# Patient Record
Sex: FEMALE
[De-identification: names, ages, dates, MRNs, and addresses within clinical notes are randomized per-mention and may not be internally consistent; named-entity substitution may affect disease eponyms.]

---

## 2019-09-10 ENCOUNTER — APPOINTMENT (OUTPATIENT)
Dept: OTOLARYNGOLOGY | Facility: CLINIC | Age: 19
End: 2019-09-10
Payer: COMMERCIAL

## 2019-09-10 VITALS
WEIGHT: 125 LBS | BODY MASS INDEX: 20.83 KG/M2 | DIASTOLIC BLOOD PRESSURE: 55 MMHG | HEART RATE: 61 BPM | HEIGHT: 65 IN | SYSTOLIC BLOOD PRESSURE: 101 MMHG

## 2019-09-10 DIAGNOSIS — Z78.9 OTHER SPECIFIED HEALTH STATUS: ICD-10-CM

## 2019-09-10 DIAGNOSIS — Q44.7 OTHER CONGENITAL MALFORMATIONS OF LIVER: ICD-10-CM

## 2019-09-10 DIAGNOSIS — H90.0 CONDUCTIVE HEARING LOSS, BILATERAL: ICD-10-CM

## 2019-09-10 DIAGNOSIS — H69.83 OTHER SPECIFIED DISORDERS OF EUSTACHIAN TUBE, BILATERAL: ICD-10-CM

## 2019-09-10 DIAGNOSIS — Z86.79 PERSONAL HISTORY OF OTHER DISEASES OF THE CIRCULATORY SYSTEM: ICD-10-CM

## 2019-09-10 PROBLEM — Z00.00 ENCOUNTER FOR PREVENTIVE HEALTH EXAMINATION: Status: ACTIVE | Noted: 2019-09-10

## 2019-09-10 PROCEDURE — 99203 OFFICE O/P NEW LOW 30 MIN: CPT

## 2019-09-10 PROCEDURE — 92567 TYMPANOMETRY: CPT

## 2019-09-10 PROCEDURE — 92557 COMPREHENSIVE HEARING TEST: CPT

## 2019-09-10 RX ORDER — CEFPODOXIME PROXETIL 200 MG/1
200 TABLET, FILM COATED ORAL
Qty: 20 | Refills: 0 | Status: COMPLETED | COMMUNITY
Start: 2019-04-10

## 2019-09-10 RX ORDER — FOSFOMYCIN TROMETHAMINE 3 G/1
3 POWDER ORAL
Qty: 1 | Refills: 0 | Status: COMPLETED | COMMUNITY
Start: 2019-07-16

## 2019-09-10 RX ORDER — ONDANSETRON 4 MG/1
4 TABLET ORAL
Qty: 12 | Refills: 0 | Status: COMPLETED | COMMUNITY
Start: 2019-08-02

## 2019-09-10 RX ORDER — FLUCONAZOLE 150 MG/1
150 TABLET ORAL
Qty: 4 | Refills: 0 | Status: COMPLETED | COMMUNITY
Start: 2019-04-10

## 2019-09-10 RX ORDER — NORETHINDRONE ACETATE AND ETHINYL ESTRADIOL, ETHINYL ESTRADIOL AND FERROUS FUMARATE 1MG-10(24)
1 MG-10 MCG / KIT ORAL
Qty: 84 | Refills: 0 | Status: ACTIVE | COMMUNITY
Start: 2019-04-28

## 2019-09-10 RX ORDER — MUPIROCIN 20 MG/G
2 OINTMENT TOPICAL
Qty: 22 | Refills: 0 | Status: COMPLETED | COMMUNITY
Start: 2019-08-02

## 2019-09-10 RX ORDER — METRONIDAZOLE 500 MG/1
500 TABLET ORAL
Qty: 42 | Refills: 0 | Status: COMPLETED | COMMUNITY
Start: 2019-08-02

## 2019-09-10 RX ORDER — TACROLIMUS 1 MG/1
1 CAPSULE ORAL
Qty: 270 | Refills: 0 | Status: ACTIVE | COMMUNITY
Start: 2019-04-01

## 2019-09-10 RX ORDER — AMOXICILLIN AND CLAVULANATE POTASSIUM 500; 125 MG/1; MG/1
500-125 TABLET, FILM COATED ORAL
Qty: 20 | Refills: 0 | Status: COMPLETED | COMMUNITY
Start: 2019-08-02

## 2019-09-10 RX ORDER — ALPRAZOLAM 0.5 MG/1
0.5 TABLET ORAL
Qty: 2 | Refills: 0 | Status: COMPLETED | COMMUNITY
Start: 2019-07-26

## 2019-09-10 RX ORDER — TACROLIMUS 0.5 MG/1
0.5 CAPSULE ORAL
Qty: 180 | Refills: 0 | Status: ACTIVE | COMMUNITY
Start: 2019-04-01

## 2019-09-10 RX ORDER — BUDESONIDE AND FORMOTEROL FUMARATE DIHYDRATE 80; 4.5 UG/1; UG/1
80-4.5 AEROSOL RESPIRATORY (INHALATION)
Qty: 10 | Refills: 0 | Status: COMPLETED | COMMUNITY
Start: 2019-09-09

## 2019-09-10 RX ORDER — TACROLIMUS 0.5 MG/1
CAPSULE, GELATIN COATED ORAL
Refills: 0 | Status: COMPLETED | COMMUNITY

## 2019-09-10 RX ORDER — PREDNISONE 10 MG/1
10 TABLET ORAL
Qty: 12 | Refills: 0 | Status: ACTIVE | COMMUNITY
Start: 2019-09-10 | End: 1900-01-01

## 2019-09-10 RX ORDER — CIPROFLOXACIN 3 MG/ML
0.3 SOLUTION OPHTHALMIC
Qty: 5 | Refills: 0 | Status: COMPLETED | COMMUNITY
Start: 2019-08-02

## 2019-09-10 NOTE — ASSESSMENT
[FreeTextEntry1] : She has a history of eustachian tube dysfunction after flying with an upper respiratory tract infection. There was no acute ear infection. She does have retracted tympanic membranes with possible effusion on the left side. Audiogram showed a bilateral conductive hearing loss. She said that the symptoms of the cold have been improving.\par \par PLAN\par \par -findings and management options discussed in detail with the patient. \par -good aural hygiene\par -monitor hearing. \par -She may use a topical decongestant nasal spray for 3-5 days.\par -Nasal steroid spray and antihistamine and decongestant as needed\par -She may try a burst of oral steroids.  I reviewed some of the associatd risks including the risk of mood changes, GI complications and bone issues/fractures. She should take the medication with with food. She may consider Zantac or Pepcid as well. She may want to check with her PCP before taking medication because of her liver transplant\par -She may consider myringotomy if her symptoms do not improve\par -She deferred nasal endoscopy today since her sinuses are feeling better. If she has worsening or persistent symptoms, I recommend to return for the procedure\par -follow up 1-2 weeks\par -call and return earlier if any concerns. \par

## 2019-09-10 NOTE — CONSULT LETTER
[Dear  ___] : Dear  [unfilled], [Consult Letter:] : I had the pleasure of evaluating your patient, [unfilled]. [Please see my note below.] : Please see my note below. [Consult Closing:] : Thank you very much for allowing me to participate in the care of this patient.  If you have any questions, please do not hesitate to contact me. [Sincerely,] : Sincerely, [FreeTextEntry3] : Cherelle Foster MD\par

## 2019-09-10 NOTE — HISTORY OF PRESENT ILLNESS
[de-identified] : TETO MCKINNEY is a 19 year patient With a history of Alagille syndrome and liver transplant here for a two-day history of eustachian tube dysfunction and ear pressure. She has had some hearing loss. She has no otalgia, otorrhea, or vertigo. She flew with an upper respiratory tract infection. She tried a decongestant nasal spray a couple of times. She has a little bit of nasal congestion but the symptoms of the cold have been resolving. She has no sinus pain or pressure\par \par History of recurrent ear infections-she then had recurrent ear infections as a child\par Prior ear surgery-no\par History of otologic trauma-concussion about 2 years ago\par Noise exposure-no\par Family history of hearing loss-her grandmother has hearing loss\par Prior audiogram-no recent testing

## 2019-09-26 ENCOUNTER — APPOINTMENT (OUTPATIENT)
Dept: OTOLARYNGOLOGY | Facility: CLINIC | Age: 19
End: 2019-09-26